# Patient Record
Sex: FEMALE | Race: WHITE | NOT HISPANIC OR LATINO | URBAN - METROPOLITAN AREA
[De-identification: names, ages, dates, MRNs, and addresses within clinical notes are randomized per-mention and may not be internally consistent; named-entity substitution may affect disease eponyms.]

---
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
## 2023-02-27 NOTE — DISCHARGE NOTE NURSING/CASE MANAGEMENT/SOCIAL WORK - NSDCPNPNATDISSUGG_GEN_ALL_CORE
Interventional Radiology Focus Note      Discussed results with Caro as well as plan to perform angiogram.      Discussed procedure as well as risks.  Script for prednisone/ benadryl sent to her preferred pharmacy for contrast allergy.     All questions answered.    Miracle Romano PA-C   No